# Patient Record
Sex: FEMALE | Race: WHITE | NOT HISPANIC OR LATINO | URBAN - METROPOLITAN AREA
[De-identification: names, ages, dates, MRNs, and addresses within clinical notes are randomized per-mention and may not be internally consistent; named-entity substitution may affect disease eponyms.]

---

## 2020-01-05 ENCOUNTER — INPATIENT (INPATIENT)
Facility: HOSPITAL | Age: 60
LOS: 2 days | Discharge: HOME | DRG: 872 | End: 2020-01-08
Attending: HOSPITALIST | Admitting: HOSPITALIST
Payer: COMMERCIAL

## 2020-01-05 VITALS
RESPIRATION RATE: 20 BRPM | HEIGHT: 68 IN | TEMPERATURE: 100 F | OXYGEN SATURATION: 96 % | WEIGHT: 173.06 LBS | DIASTOLIC BLOOD PRESSURE: 65 MMHG | SYSTOLIC BLOOD PRESSURE: 127 MMHG | HEART RATE: 121 BPM

## 2020-01-05 DIAGNOSIS — Z90.710 ACQUIRED ABSENCE OF BOTH CERVIX AND UTERUS: Chronic | ICD-10-CM

## 2020-01-05 LAB
APTT BLD: 33.4 SEC — SIGNIFICANT CHANGE UP (ref 27.5–36.3)
HCT VFR BLD CALC: 32.6 % — LOW (ref 34.5–45)
HGB BLD-MCNC: 10.1 G/DL — LOW (ref 11.5–15.5)
INR BLD: 1.09 RATIO — SIGNIFICANT CHANGE UP (ref 0.88–1.16)
MCHC RBC-ENTMCNC: 18.9 PG — LOW (ref 27–34)
MCHC RBC-ENTMCNC: 31 GM/DL — LOW (ref 32–36)
MCV RBC AUTO: 61.2 FL — LOW (ref 80–100)
PLATELET # BLD AUTO: 457 K/UL — HIGH (ref 150–400)
PROTHROM AB SERPL-ACNC: 12.6 SEC — SIGNIFICANT CHANGE UP (ref 10–12.9)
RBC # BLD: 5.33 M/UL — HIGH (ref 3.8–5.2)
RBC # FLD: 15.9 % — HIGH (ref 10.3–14.5)
WBC # BLD: 12.88 K/UL — HIGH (ref 3.8–10.5)
WBC # FLD AUTO: 12.88 K/UL — HIGH (ref 3.8–10.5)

## 2020-01-05 PROCEDURE — 99285 EMERGENCY DEPT VISIT HI MDM: CPT

## 2020-01-05 PROCEDURE — 71045 X-RAY EXAM CHEST 1 VIEW: CPT | Mod: 26

## 2020-01-05 RX ORDER — PIPERACILLIN AND TAZOBACTAM 4; .5 G/20ML; G/20ML
3.38 INJECTION, POWDER, LYOPHILIZED, FOR SOLUTION INTRAVENOUS ONCE
Refills: 0 | Status: COMPLETED | OUTPATIENT
Start: 2020-01-05 | End: 2020-01-05

## 2020-01-05 RX ORDER — MORPHINE SULFATE 50 MG/1
4 CAPSULE, EXTENDED RELEASE ORAL ONCE
Refills: 0 | Status: DISCONTINUED | OUTPATIENT
Start: 2020-01-05 | End: 2020-01-05

## 2020-01-05 RX ORDER — ACETAMINOPHEN 500 MG
650 TABLET ORAL ONCE
Refills: 0 | Status: COMPLETED | OUTPATIENT
Start: 2020-01-05 | End: 2020-01-05

## 2020-01-05 RX ORDER — ONDANSETRON 8 MG/1
4 TABLET, FILM COATED ORAL ONCE
Refills: 0 | Status: COMPLETED | OUTPATIENT
Start: 2020-01-05 | End: 2020-01-05

## 2020-01-05 RX ORDER — SODIUM CHLORIDE 9 MG/ML
2400 INJECTION INTRAMUSCULAR; INTRAVENOUS; SUBCUTANEOUS ONCE
Refills: 0 | Status: COMPLETED | OUTPATIENT
Start: 2020-01-05 | End: 2020-01-05

## 2020-01-05 RX ADMIN — SODIUM CHLORIDE 2400 MILLILITER(S): 9 INJECTION INTRAMUSCULAR; INTRAVENOUS; SUBCUTANEOUS at 23:35

## 2020-01-05 RX ADMIN — PIPERACILLIN AND TAZOBACTAM 200 GRAM(S): 4; .5 INJECTION, POWDER, LYOPHILIZED, FOR SOLUTION INTRAVENOUS at 23:48

## 2020-01-05 RX ADMIN — Medication 650 MILLIGRAM(S): at 23:48

## 2020-01-05 NOTE — ED ADULT TRIAGE NOTE - CHIEF COMPLAINT QUOTE
patient states that she has abdominal pain with nausea back pain chills fever has HX of diverticulitis, sepsis called

## 2020-01-05 NOTE — ED ADULT NURSE NOTE - OBJECTIVE STATEMENT
patient c/o left sided abdominal pain starting yesterday and worsening today, hx of diverticulitis, states "it feels the same as it always does." pain radiates to back. denies N/V/D, reports constipation. code sepsis called for fever and tachycardia, MD Blaustein at bedside for eval.

## 2020-01-05 NOTE — ED PROVIDER NOTE - OBJECTIVE STATEMENT
58 y/o F pt, with Hx of Cholecystectomy, Hysterectomy, Depression, ADHD (on Adderall and Cymbalta), HLD (on Zetia), Diverticulitis, presents to the ED, from urgent care, c/o LLQ abd pain that began last night. Pt reports bloating, describes as "fullness" which since persisted, and that began 4 days ago. However, says that she began feeling LLQ abd pain last night, worse with movement and palpation. Also reports constipation, decreased PO intake, nausea, fever, and chills. Pt has had Colonoscopy in the past, but no recent CT. Pt is allergic to Ibuprofen which causes hives, thus given 2x pills of Tylenol 1.5 hours ago at urgent care. Denies vomiting and diarrhea. GI: Dr. Thee Negron. PCP: Dr. Jean in [place].

## 2020-01-05 NOTE — ED PROVIDER NOTE - PROGRESS NOTE DETAILS
Labs and CT results as noted.  Case d/w Hospitalist and will admit for continued IV Abx and eval by GI

## 2020-01-05 NOTE — ED ADULT NURSE NOTE - CHPI ED NUR SYMPTOMS NEG
no diarrhea/no dysuria/no hematuria/no blood in stool/no burning urination/no chills/no nausea/no vomiting

## 2020-01-06 DIAGNOSIS — K57.92 DIVERTICULITIS OF INTESTINE, PART UNSPECIFIED, WITHOUT PERFORATION OR ABSCESS WITHOUT BLEEDING: ICD-10-CM

## 2020-01-06 LAB
ALBUMIN SERPL ELPH-MCNC: 3.6 G/DL — SIGNIFICANT CHANGE UP (ref 3.3–5.2)
ALBUMIN SERPL ELPH-MCNC: 4.4 G/DL — SIGNIFICANT CHANGE UP (ref 3.3–5.2)
ALP SERPL-CCNC: 110 U/L — SIGNIFICANT CHANGE UP (ref 40–120)
ALP SERPL-CCNC: 172 U/L — HIGH (ref 40–120)
ALT FLD-CCNC: 181 U/L — HIGH
ALT FLD-CCNC: 52 U/L — HIGH
ANION GAP SERPL CALC-SCNC: 13 MMOL/L — SIGNIFICANT CHANGE UP (ref 5–17)
ANION GAP SERPL CALC-SCNC: 8 MMOL/L — SIGNIFICANT CHANGE UP (ref 5–17)
ANISOCYTOSIS BLD QL: SIGNIFICANT CHANGE UP
APPEARANCE UR: CLEAR — SIGNIFICANT CHANGE UP
AST SERPL-CCNC: 202 U/L — HIGH
AST SERPL-CCNC: 42 U/L — HIGH
BACTERIA # UR AUTO: ABNORMAL
BASOPHILS # BLD AUTO: 0 K/UL — SIGNIFICANT CHANGE UP (ref 0–0.2)
BASOPHILS NFR BLD AUTO: 0 % — SIGNIFICANT CHANGE UP (ref 0–2)
BILIRUB SERPL-MCNC: 0.4 MG/DL — SIGNIFICANT CHANGE UP (ref 0.4–2)
BILIRUB SERPL-MCNC: 1.1 MG/DL — SIGNIFICANT CHANGE UP (ref 0.4–2)
BILIRUB UR-MCNC: NEGATIVE — SIGNIFICANT CHANGE UP
BUN SERPL-MCNC: 10 MG/DL — SIGNIFICANT CHANGE UP (ref 8–20)
BUN SERPL-MCNC: 14 MG/DL — SIGNIFICANT CHANGE UP (ref 8–20)
CALCIUM SERPL-MCNC: 8.3 MG/DL — LOW (ref 8.6–10.2)
CALCIUM SERPL-MCNC: 9.3 MG/DL — SIGNIFICANT CHANGE UP (ref 8.6–10.2)
CHLORIDE SERPL-SCNC: 101 MMOL/L — SIGNIFICANT CHANGE UP (ref 98–107)
CHLORIDE SERPL-SCNC: 96 MMOL/L — LOW (ref 98–107)
CO2 SERPL-SCNC: 23 MMOL/L — SIGNIFICANT CHANGE UP (ref 22–29)
CO2 SERPL-SCNC: 25 MMOL/L — SIGNIFICANT CHANGE UP (ref 22–29)
COLOR SPEC: YELLOW — SIGNIFICANT CHANGE UP
COMMENT - URINE: SIGNIFICANT CHANGE UP
CREAT SERPL-MCNC: 0.86 MG/DL — SIGNIFICANT CHANGE UP (ref 0.5–1.3)
CREAT SERPL-MCNC: 0.93 MG/DL — SIGNIFICANT CHANGE UP (ref 0.5–1.3)
DACRYOCYTES BLD QL SMEAR: SLIGHT — SIGNIFICANT CHANGE UP
DIFF PNL FLD: ABNORMAL
ELLIPTOCYTES BLD QL SMEAR: SLIGHT — SIGNIFICANT CHANGE UP
EOSINOPHIL # BLD AUTO: 0.22 K/UL — SIGNIFICANT CHANGE UP (ref 0–0.5)
EOSINOPHIL NFR BLD AUTO: 1.7 % — SIGNIFICANT CHANGE UP (ref 0–6)
EPI CELLS # UR: SIGNIFICANT CHANGE UP
GIANT PLATELETS BLD QL SMEAR: PRESENT — SIGNIFICANT CHANGE UP
GLUCOSE SERPL-MCNC: 106 MG/DL — SIGNIFICANT CHANGE UP (ref 70–115)
GLUCOSE SERPL-MCNC: 114 MG/DL — SIGNIFICANT CHANGE UP (ref 70–115)
GLUCOSE UR QL: NEGATIVE MG/DL — SIGNIFICANT CHANGE UP
HCT VFR BLD CALC: 28.1 % — LOW (ref 34.5–45)
HGB BLD-MCNC: 8.3 G/DL — LOW (ref 11.5–15.5)
HYPOCHROMIA BLD QL: SLIGHT — SIGNIFICANT CHANGE UP
KETONES UR-MCNC: NEGATIVE — SIGNIFICANT CHANGE UP
LEUKOCYTE ESTERASE UR-ACNC: ABNORMAL
LYMPHOCYTES # BLD AUTO: 1.34 K/UL — SIGNIFICANT CHANGE UP (ref 1–3.3)
LYMPHOCYTES # BLD AUTO: 10.4 % — LOW (ref 13–44)
MANUAL SMEAR VERIFICATION: SIGNIFICANT CHANGE UP
MCHC RBC-ENTMCNC: 18.9 PG — LOW (ref 27–34)
MCHC RBC-ENTMCNC: 29.5 GM/DL — LOW (ref 32–36)
MCV RBC AUTO: 63.9 FL — LOW (ref 80–100)
MICROCYTES BLD QL: SIGNIFICANT CHANGE UP
MONOCYTES # BLD AUTO: 0.79 K/UL — SIGNIFICANT CHANGE UP (ref 0–0.9)
MONOCYTES NFR BLD AUTO: 6.1 % — SIGNIFICANT CHANGE UP (ref 2–14)
MYELOCYTES NFR BLD: 0.9 % — HIGH (ref 0–0)
NEUTROPHILS # BLD AUTO: 10.3 K/UL — HIGH (ref 1.8–7.4)
NEUTROPHILS NFR BLD AUTO: 80 % — HIGH (ref 43–77)
NITRITE UR-MCNC: NEGATIVE — SIGNIFICANT CHANGE UP
OVALOCYTES BLD QL SMEAR: SLIGHT — SIGNIFICANT CHANGE UP
PH UR: 5 — SIGNIFICANT CHANGE UP (ref 5–8)
PLAT MORPH BLD: NORMAL — SIGNIFICANT CHANGE UP
PLATELET # BLD AUTO: 342 K/UL — SIGNIFICANT CHANGE UP (ref 150–400)
PLATELET COUNT - ESTIMATE: NORMAL — SIGNIFICANT CHANGE UP
POIKILOCYTOSIS BLD QL AUTO: SLIGHT — SIGNIFICANT CHANGE UP
POLYCHROMASIA BLD QL SMEAR: SIGNIFICANT CHANGE UP
POTASSIUM SERPL-MCNC: 3.9 MMOL/L — SIGNIFICANT CHANGE UP (ref 3.5–5.3)
POTASSIUM SERPL-MCNC: 4.4 MMOL/L — SIGNIFICANT CHANGE UP (ref 3.5–5.3)
POTASSIUM SERPL-SCNC: 3.9 MMOL/L — SIGNIFICANT CHANGE UP (ref 3.5–5.3)
POTASSIUM SERPL-SCNC: 4.4 MMOL/L — SIGNIFICANT CHANGE UP (ref 3.5–5.3)
PROT SERPL-MCNC: 6.3 G/DL — LOW (ref 6.6–8.7)
PROT SERPL-MCNC: 7.6 G/DL — SIGNIFICANT CHANGE UP (ref 6.6–8.7)
PROT UR-MCNC: NEGATIVE MG/DL — SIGNIFICANT CHANGE UP
RBC # BLD: 4.4 M/UL — SIGNIFICANT CHANGE UP (ref 3.8–5.2)
RBC # FLD: 16 % — HIGH (ref 10.3–14.5)
RBC BLD AUTO: ABNORMAL
RBC CASTS # UR COMP ASSIST: SIGNIFICANT CHANGE UP /HPF (ref 0–4)
SODIUM SERPL-SCNC: 132 MMOL/L — LOW (ref 135–145)
SODIUM SERPL-SCNC: 134 MMOL/L — LOW (ref 135–145)
SP GR SPEC: 1.01 — SIGNIFICANT CHANGE UP (ref 1.01–1.02)
UROBILINOGEN FLD QL: NEGATIVE MG/DL — SIGNIFICANT CHANGE UP
VARIANT LYMPHS # BLD: 0.9 % — SIGNIFICANT CHANGE UP (ref 0–6)
WBC # BLD: 7.63 K/UL — SIGNIFICANT CHANGE UP (ref 3.8–10.5)
WBC # FLD AUTO: 7.63 K/UL — SIGNIFICANT CHANGE UP (ref 3.8–10.5)
WBC UR QL: ABNORMAL

## 2020-01-06 PROCEDURE — 74177 CT ABD & PELVIS W/CONTRAST: CPT | Mod: 26

## 2020-01-06 PROCEDURE — 93010 ELECTROCARDIOGRAM REPORT: CPT

## 2020-01-06 RX ORDER — HYDROMORPHONE HYDROCHLORIDE 2 MG/ML
1 INJECTION INTRAMUSCULAR; INTRAVENOUS; SUBCUTANEOUS EVERY 4 HOURS
Refills: 0 | Status: DISCONTINUED | OUTPATIENT
Start: 2020-01-06 | End: 2020-01-08

## 2020-01-06 RX ORDER — HYDROMORPHONE HYDROCHLORIDE 2 MG/ML
1 INJECTION INTRAMUSCULAR; INTRAVENOUS; SUBCUTANEOUS ONCE
Refills: 0 | Status: DISCONTINUED | OUTPATIENT
Start: 2020-01-06 | End: 2020-01-06

## 2020-01-06 RX ORDER — EZETIMIBE 10 MG/1
10 TABLET ORAL DAILY
Refills: 0 | Status: DISCONTINUED | OUTPATIENT
Start: 2020-01-06 | End: 2020-01-08

## 2020-01-06 RX ORDER — ONDANSETRON 8 MG/1
4 TABLET, FILM COATED ORAL ONCE
Refills: 0 | Status: COMPLETED | OUTPATIENT
Start: 2020-01-06 | End: 2020-01-06

## 2020-01-06 RX ORDER — METRONIDAZOLE 500 MG
500 TABLET ORAL EVERY 8 HOURS
Refills: 0 | Status: DISCONTINUED | OUTPATIENT
Start: 2020-01-06 | End: 2020-01-08

## 2020-01-06 RX ORDER — DULOXETINE HYDROCHLORIDE 30 MG/1
0 CAPSULE, DELAYED RELEASE ORAL
Qty: 0 | Refills: 0 | DISCHARGE

## 2020-01-06 RX ORDER — LEVOTHYROXINE SODIUM 125 MCG
1 TABLET ORAL
Qty: 0 | Refills: 0 | DISCHARGE

## 2020-01-06 RX ORDER — CIPROFLOXACIN LACTATE 400MG/40ML
400 VIAL (ML) INTRAVENOUS EVERY 12 HOURS
Refills: 0 | Status: DISCONTINUED | OUTPATIENT
Start: 2020-01-06 | End: 2020-01-08

## 2020-01-06 RX ORDER — EZETIMIBE 10 MG/1
1 TABLET ORAL
Qty: 0 | Refills: 0 | DISCHARGE

## 2020-01-06 RX ORDER — SACCHAROMYCES BOULARDII 250 MG
250 POWDER IN PACKET (EA) ORAL
Refills: 0 | Status: DISCONTINUED | OUTPATIENT
Start: 2020-01-06 | End: 2020-01-08

## 2020-01-06 RX ORDER — ACETAMINOPHEN 500 MG
650 TABLET ORAL EVERY 6 HOURS
Refills: 0 | Status: DISCONTINUED | OUTPATIENT
Start: 2020-01-06 | End: 2020-01-08

## 2020-01-06 RX ORDER — ONDANSETRON 8 MG/1
4 TABLET, FILM COATED ORAL EVERY 6 HOURS
Refills: 0 | Status: DISCONTINUED | OUTPATIENT
Start: 2020-01-06 | End: 2020-01-08

## 2020-01-06 RX ORDER — INFLUENZA VIRUS VACCINE 15; 15; 15; 15 UG/.5ML; UG/.5ML; UG/.5ML; UG/.5ML
0.5 SUSPENSION INTRAMUSCULAR ONCE
Refills: 0 | Status: DISCONTINUED | OUTPATIENT
Start: 2020-01-06 | End: 2020-01-08

## 2020-01-06 RX ORDER — LEVOTHYROXINE SODIUM 125 MCG
50 TABLET ORAL DAILY
Refills: 0 | Status: DISCONTINUED | OUTPATIENT
Start: 2020-01-06 | End: 2020-01-08

## 2020-01-06 RX ORDER — DEXTROAMPHETAMINE SACCHARATE, AMPHETAMINE ASPARTATE, DEXTROAMPHETAMINE SULFATE AND AMPHETAMINE SULFATE 1.875; 1.875; 1.875; 1.875 MG/1; MG/1; MG/1; MG/1
0 TABLET ORAL
Qty: 0 | Refills: 0 | DISCHARGE

## 2020-01-06 RX ORDER — CITALOPRAM 10 MG/1
20 TABLET, FILM COATED ORAL DAILY
Refills: 0 | Status: DISCONTINUED | OUTPATIENT
Start: 2020-01-06 | End: 2020-01-08

## 2020-01-06 RX ORDER — DEXTROAMPHETAMINE SACCHARATE, AMPHETAMINE ASPARTATE, DEXTROAMPHETAMINE SULFATE AND AMPHETAMINE SULFATE 1.875; 1.875; 1.875; 1.875 MG/1; MG/1; MG/1; MG/1
1 TABLET ORAL
Qty: 0 | Refills: 0 | DISCHARGE

## 2020-01-06 RX ORDER — SODIUM CHLORIDE 9 MG/ML
1000 INJECTION INTRAMUSCULAR; INTRAVENOUS; SUBCUTANEOUS
Refills: 0 | Status: DISCONTINUED | OUTPATIENT
Start: 2020-01-06 | End: 2020-01-07

## 2020-01-06 RX ORDER — MORPHINE SULFATE 50 MG/1
2 CAPSULE, EXTENDED RELEASE ORAL ONCE
Refills: 0 | Status: DISCONTINUED | OUTPATIENT
Start: 2020-01-06 | End: 2020-01-06

## 2020-01-06 RX ORDER — DEXTROAMPHETAMINE SACCHARATE, AMPHETAMINE ASPARTATE, DEXTROAMPHETAMINE SULFATE AND AMPHETAMINE SULFATE 1.875; 1.875; 1.875; 1.875 MG/1; MG/1; MG/1; MG/1
20 TABLET ORAL DAILY
Refills: 0 | Status: DISCONTINUED | OUTPATIENT
Start: 2020-01-06 | End: 2020-01-08

## 2020-01-06 RX ADMIN — Medication 100 MILLIGRAM(S): at 15:55

## 2020-01-06 RX ADMIN — ONDANSETRON 4 MILLIGRAM(S): 8 TABLET, FILM COATED ORAL at 00:05

## 2020-01-06 RX ADMIN — HYDROMORPHONE HYDROCHLORIDE 1 MILLIGRAM(S): 2 INJECTION INTRAMUSCULAR; INTRAVENOUS; SUBCUTANEOUS at 06:35

## 2020-01-06 RX ADMIN — MORPHINE SULFATE 2 MILLIGRAM(S): 50 CAPSULE, EXTENDED RELEASE ORAL at 14:32

## 2020-01-06 RX ADMIN — Medication 200 MILLIGRAM(S): at 16:58

## 2020-01-06 RX ADMIN — HYDROMORPHONE HYDROCHLORIDE 1 MILLIGRAM(S): 2 INJECTION INTRAMUSCULAR; INTRAVENOUS; SUBCUTANEOUS at 02:36

## 2020-01-06 RX ADMIN — HYDROMORPHONE HYDROCHLORIDE 1 MILLIGRAM(S): 2 INJECTION INTRAMUSCULAR; INTRAVENOUS; SUBCUTANEOUS at 02:15

## 2020-01-06 RX ADMIN — Medication 100 MILLIGRAM(S): at 23:25

## 2020-01-06 RX ADMIN — Medication 200 MILLIGRAM(S): at 06:49

## 2020-01-06 RX ADMIN — Medication 250 MILLIGRAM(S): at 16:58

## 2020-01-06 RX ADMIN — SODIUM CHLORIDE 100 MILLILITER(S): 9 INJECTION INTRAMUSCULAR; INTRAVENOUS; SUBCUTANEOUS at 06:33

## 2020-01-06 RX ADMIN — Medication 100 MILLIGRAM(S): at 08:02

## 2020-01-06 RX ADMIN — MORPHINE SULFATE 4 MILLIGRAM(S): 50 CAPSULE, EXTENDED RELEASE ORAL at 00:03

## 2020-01-06 RX ADMIN — HYDROMORPHONE HYDROCHLORIDE 1 MILLIGRAM(S): 2 INJECTION INTRAMUSCULAR; INTRAVENOUS; SUBCUTANEOUS at 21:57

## 2020-01-06 RX ADMIN — Medication 50 MICROGRAM(S): at 06:50

## 2020-01-06 RX ADMIN — Medication 250 MILLIGRAM(S): at 06:49

## 2020-01-06 RX ADMIN — HYDROMORPHONE HYDROCHLORIDE 1 MILLIGRAM(S): 2 INJECTION INTRAMUSCULAR; INTRAVENOUS; SUBCUTANEOUS at 22:12

## 2020-01-06 NOTE — ED ADULT NURSE REASSESSMENT NOTE - NS ED NURSE REASSESS COMMENT FT1
Admitting physician Dr. MARCH at pt bedside at this time, POC discussed with pt and spouse, pt to be admitted to the hospital and administered ATC IV abx, questions encouraged and answered appropriately, pt agrees with plan, safety and comfort maintained, awaiting admission orders.

## 2020-01-06 NOTE — H&P ADULT - HISTORY OF PRESENT ILLNESS
60 y/o female with PMH of diverticulitis (5 episodes in the past), depression, ADHD (on Adderall), HLD came to the ED complaining of LLQ pain. Patient said her symptom started about 1 week ago with abdominal bloating she described as heaviness/fullness; alternate between constipation and diarrhea. 4 days ago, her symptom worsened, pain localized to the LLQ, sharp pain with "intense spasm" radiating to the back, exacerbated by moving and turning, associated with fever and decrease PO intake, nausea. She has no vomiting, chest pain, shortness of breath, palpitation, dysuria, hematuria, melena. Last colonoscopy in 2013 showed diverticulosis in the sigmoid colon.

## 2020-01-06 NOTE — ED ADULT NURSE REASSESSMENT NOTE - NS ED NURSE REASSESS COMMENT FT1
Report recvd from critical care RN, pt care endorsed, pt recvdA&Ox4, offers no complaints, able to verbalize reasoning for stay, pt awaiting further test, pt placed on CM NSR, pt safety and comfort maintained. Report recvd from critical care RN, pt care endorsed, pt recvdA&Ox4, pt now complaining of left sided pain, medication to be administered as ordered, pt able to verbalize reasoning for stay, pt awaiting further test, pt placed on CM NSR, pt safety and comfort maintained.

## 2020-01-06 NOTE — ED ADULT NURSE REASSESSMENT NOTE - NS ED NURSE REASSESS COMMENT FT1
Pt received in the stretcher resting comfortably, no distress noted, no chest pain reported, VSS, breathing easy and unlabored , will continue to monitor

## 2020-01-06 NOTE — ED ADULT NURSE REASSESSMENT NOTE - NS ED NURSE REASSESS COMMENT FT1
Pt moved over transitional Unit, verbal report give to receiving nurse Dm , all questions answered, pt in no distress at the time of transfer, care endorsed at this time

## 2020-01-06 NOTE — H&P ADULT - ASSESSMENT
60 y/o female with PMH of diverticulitis (5 episodes in the past), depression, ADHD (on Adderall), HLD came to the ED complaining of LLQ pain. CT abdomen: acute descending colonic diverticulitis.     Acute diverticulitis   Admit to medical floor   CT abdomen as noted above   Zosyn once given in the ED; will continue with Cipro and Flagyl   Pain control   Gentle hydration     LLQ abdominal pain  Due to acute diverticulitis   Plan as above     Sepsis   Likely 2/2 to diverticulitis   Septic work up sent; follow up   Continue antibiotic   Trend WBC and fever   Monitor BP; keep SBP > 90 and MAP > 65     Hyponatremia   Na: 132   IVF given   Monitor BMP     Hypothyroidism   Synthroid 50mcg     Depression/ADHD   Patient is on Cymbalta and Adderall but unsure of the dosage; please confirm dosage and continue     HLD   Zetia 10mg     Supportive   DVT prophylaxis: Heparin sc   Diet: clear liquid; advance as needed 58 y/o female with PMH of diverticulitis (5 episodes in the past), depression, ADHD (on Adderall), HLD came to the ED complaining of LLQ pain. CT abdomen: acute descending colonic diverticulitis.     Acute diverticulitis   Admit to medical floor   CT abdomen as noted above   Zosyn once given in the ED; will continue with Cipro and Flagyl   Pain control   Gentle hydration   Consider GI consult as needed or outpatient follow up. Patient is scheduled to see GI next week    LLQ abdominal pain  Due to acute diverticulitis   Plan as above     Sepsis   Likely 2/2 to diverticulitis   Septic work up sent; follow up   Continue antibiotic   Trend WBC and fever   Monitor BP; keep SBP > 90 and MAP > 65     Hyponatremia   Na: 132   IVF given   Monitor BMP     Hypothyroidism   Synthroid 50mcg     Depression/ADHD   Patient is on Cymbalta and Adderall but unsure of the dosage; please confirm dosage and continue     HLD   Zetia 10mg     Supportive   DVT prophylaxis: Heparin sc   Diet: clear liquid; advance as needed

## 2020-01-06 NOTE — ED ADULT NURSE REASSESSMENT NOTE - NS ED NURSE REASSESS COMMENT FT1
Pt just returned to unit from test, pt offers no complaints, VSS, pt awaiting test results, pt and spouse at bedside verbalize understanding and agree, pt resting comfortably on CM in NSR, safety and comfort maintained.

## 2020-01-07 LAB
ALBUMIN SERPL ELPH-MCNC: 3.4 G/DL — SIGNIFICANT CHANGE UP (ref 3.3–5.2)
ALP SERPL-CCNC: 168 U/L — HIGH (ref 40–120)
ALT FLD-CCNC: 112 U/L — HIGH
ANION GAP SERPL CALC-SCNC: 13 MMOL/L — SIGNIFICANT CHANGE UP (ref 5–17)
AST SERPL-CCNC: 67 U/L — HIGH
BILIRUB SERPL-MCNC: 0.4 MG/DL — SIGNIFICANT CHANGE UP (ref 0.4–2)
BUN SERPL-MCNC: 11 MG/DL — SIGNIFICANT CHANGE UP (ref 8–20)
CALCIUM SERPL-MCNC: 8.6 MG/DL — SIGNIFICANT CHANGE UP (ref 8.6–10.2)
CHLORIDE SERPL-SCNC: 100 MMOL/L — SIGNIFICANT CHANGE UP (ref 98–107)
CO2 SERPL-SCNC: 25 MMOL/L — SIGNIFICANT CHANGE UP (ref 22–29)
CREAT SERPL-MCNC: 0.76 MG/DL — SIGNIFICANT CHANGE UP (ref 0.5–1.3)
CULTURE RESULTS: SIGNIFICANT CHANGE UP
CULTURE RESULTS: SIGNIFICANT CHANGE UP
FERRITIN SERPL-MCNC: 234 NG/ML — HIGH (ref 15–150)
FOLATE SERPL-MCNC: 12 NG/ML — SIGNIFICANT CHANGE UP
GLUCOSE SERPL-MCNC: 138 MG/DL — HIGH (ref 70–115)
HCT VFR BLD CALC: 26 % — LOW (ref 34.5–45)
HCV AB S/CO SERPL IA: 0.1 S/CO — SIGNIFICANT CHANGE UP (ref 0–0.99)
HCV AB SERPL-IMP: SIGNIFICANT CHANGE UP
HGB BLD-MCNC: 7.7 G/DL — LOW (ref 11.5–15.5)
IRON SATN MFR SERPL: 11 % — LOW (ref 14–50)
IRON SATN MFR SERPL: 29 UG/DL — LOW (ref 37–145)
MAGNESIUM SERPL-MCNC: 1.8 MG/DL — SIGNIFICANT CHANGE UP (ref 1.6–2.6)
MCHC RBC-ENTMCNC: 18.6 PG — LOW (ref 27–34)
MCHC RBC-ENTMCNC: 29.6 GM/DL — LOW (ref 32–36)
MCV RBC AUTO: 62.7 FL — LOW (ref 80–100)
PLATELET # BLD AUTO: 320 K/UL — SIGNIFICANT CHANGE UP (ref 150–400)
POTASSIUM SERPL-MCNC: 3.6 MMOL/L — SIGNIFICANT CHANGE UP (ref 3.5–5.3)
POTASSIUM SERPL-SCNC: 3.6 MMOL/L — SIGNIFICANT CHANGE UP (ref 3.5–5.3)
PROT SERPL-MCNC: 6 G/DL — LOW (ref 6.6–8.7)
RBC # BLD: 4.15 M/UL — SIGNIFICANT CHANGE UP (ref 3.8–5.2)
RBC # FLD: 15.9 % — HIGH (ref 10.3–14.5)
SODIUM SERPL-SCNC: 138 MMOL/L — SIGNIFICANT CHANGE UP (ref 135–145)
SPECIMEN SOURCE: SIGNIFICANT CHANGE UP
SPECIMEN SOURCE: SIGNIFICANT CHANGE UP
TIBC SERPL-MCNC: 253 UG/DL — SIGNIFICANT CHANGE UP (ref 220–430)
TRANSFERRIN SERPL-MCNC: 177 MG/DL — LOW (ref 192–382)
VIT B12 SERPL-MCNC: 412 PG/ML — SIGNIFICANT CHANGE UP (ref 232–1245)
WBC # BLD: 8.37 K/UL — SIGNIFICANT CHANGE UP (ref 3.8–10.5)
WBC # FLD AUTO: 8.37 K/UL — SIGNIFICANT CHANGE UP (ref 3.8–10.5)

## 2020-01-07 PROCEDURE — 99232 SBSQ HOSP IP/OBS MODERATE 35: CPT

## 2020-01-07 RX ADMIN — Medication 100 MILLIGRAM(S): at 16:33

## 2020-01-07 RX ADMIN — Medication 200 MILLIGRAM(S): at 06:04

## 2020-01-07 RX ADMIN — Medication 50 MICROGRAM(S): at 06:04

## 2020-01-07 RX ADMIN — CITALOPRAM 20 MILLIGRAM(S): 10 TABLET, FILM COATED ORAL at 12:30

## 2020-01-07 RX ADMIN — DEXTROAMPHETAMINE SACCHARATE, AMPHETAMINE ASPARTATE, DEXTROAMPHETAMINE SULFATE AND AMPHETAMINE SULFATE 20 MILLIGRAM(S): 1.875; 1.875; 1.875; 1.875 TABLET ORAL at 12:30

## 2020-01-07 RX ADMIN — Medication 200 MILLIGRAM(S): at 18:12

## 2020-01-07 RX ADMIN — Medication 250 MILLIGRAM(S): at 06:04

## 2020-01-07 RX ADMIN — EZETIMIBE 10 MILLIGRAM(S): 10 TABLET ORAL at 12:30

## 2020-01-07 RX ADMIN — Medication 100 MILLIGRAM(S): at 09:36

## 2020-01-07 RX ADMIN — Medication 250 MILLIGRAM(S): at 18:12

## 2020-01-07 NOTE — PROGRESS NOTE ADULT - SUBJECTIVE AND OBJECTIVE BOX
DENNIS CARMONA Female 59y [id number]    Patient is a 59y old  Female who presents with a chief complaint of Acute diverticulitis (2020 05:45)      Subjective/objective:  Pt seen and examined at bedside, no over night event reported by night staff. Pt reports overall feeling better, abdomen pain improving, no nausea/ vomiting or diarrhea. She wants to eat solid food.     Review of system:  No fever, chills, nausea, vomiting, headache, dizziness, chest pain, SOB or palpitation.      PHYSICAL EXAM:    Vital Signs Last 24 Hrs  T(C): 36.9 (2020 08:22), Max: 37.5 (2020 06:02)  T(F): 98.5 (2020 08:22), Max: 99.5 (2020 06:02)  HR: 86 (2020 08:22) (85 - 95)  BP: 109/60 (2020 08:22) (100/63 - 119/72)  BP(mean): --  RR: 18 (2020 08:22) (16 - 18)  SpO2: 95% (2020 08:22) (91% - 95%)    GENERAL: Pt lying comfortably, NAD.  CHEST/LUNG: Clear to auscultation bilaterally; No wheezing.  HEART: S1S2+, Regular rate and rhythm; No murmurs.  ABDOMEN: Soft, Mild tenderness+, Nondistended; Bowel sounds present.  Extremities: No LE edema, pulses+  NEURO: AAOX3, no focal deficits, no motor r sensory loss.  PSYCH: normal mood.      MEDICATIONS  (STANDING):  amphetamine/dextroamphetamine 20 milliGRAM(s) Oral daily  ciprofloxacin   IVPB 400 milliGRAM(s) IV Intermittent every 12 hours  citalopram 20 milliGRAM(s) Oral daily  ezetimibe 10 milliGRAM(s) Oral daily  influenza   Vaccine 0.5 milliLiter(s) IntraMuscular once  levothyroxine 50 MICROGram(s) Oral daily  metroNIDAZOLE  IVPB 500 milliGRAM(s) IV Intermittent every 8 hours  saccharomyces boulardii 250 milliGRAM(s) Oral two times a day    MEDICATIONS  (PRN):  acetaminophen   Tablet .. 650 milliGRAM(s) Oral every 6 hours PRN Temp greater or equal to 38C (100.4F), Mild Pain (1 - 3)  HYDROmorphone  Injectable 1 milliGRAM(s) IV Push every 4 hours PRN Severe Pain (7 - 10)  ondansetron Injectable 4 milliGRAM(s) IV Push every 6 hours PRN Nausea and/or Vomiting        Labs:  LABS:                        7.7    8.37  )-----------( 320      ( 2020 07:08 )             26.0     01-07    138  |  100  |  11.0  ----------------------------<  138<H>  3.6   |  25.0  |  0.76    Ca    8.6      2020 07:08  Mg     1.8         TPro  6.0<L>  /  Alb  3.4  /  TBili  0.4  /  DBili  x   /  AST  67<H>  /  ALT  112<H>  /  AlkPhos  168<H>  -    PT/INR - ( 2020 23:44 )   PT: 12.6 sec;   INR: 1.09 ratio         PTT - ( 2020 23:44 )  PTT:33.4 sec    LIVER FUNCTIONS - ( 2020 07:08 )  Alb: 3.4 g/dL / Pro: 6.0 g/dL / ALK PHOS: 168 U/L / ALT: 112 U/L / AST: 67 U/L / GGT: x           Urinalysis Basic - ( 2020 01:39 )    Color: Yellow / Appearance: Clear / S.010 / pH: x  Gluc: x / Ketone: Negative  / Bili: Negative / Urobili: Negative mg/dL   Blood: x / Protein: Negative mg/dL / Nitrite: Negative   Leuk Esterase: Small / RBC: 0-2 /HPF / WBC 6-10   Sq Epi: x / Non Sq Epi: Occasional / Bacteria: Occasional

## 2020-01-07 NOTE — PROGRESS NOTE ADULT - ASSESSMENT
60 y/o female with PMH of diverticulitis (5 episodes in the past), depression, ADHD (on Adderall), HLD, came to the ED complaining of LLQ pain. CT abdomen showed acute descending colonic diverticulitis. Pt admitted to medicine, started on IV Cipro/Flagyl.     >Acute diverticulitis:  - Clinically improving.   - C/w Cipro/ Flagyl.   - Wants to eat solid food- Advance diet and Advised pt if more Pain or vomiting- stop eating and let RN know.   - F/u GI PPCR and Blood cx.     >Sepsis due to above- Present on admission / leukocytosis, resolved now. plan as above.     >Anemia- hgb on admission 10, today 7.7, likely has chronic anemia and now dilutional being on IVFs. No active bleeding. Anemia w/u c/w AOCD. Monitor CBC for now.     >Hyponatremia- 2/2 dehydration, resolved now.      >Hx Hypothyroidism- C/w LT4 50 mcg.  >Hx Depression/ADHD- C/w home Cymbalta and Adderall.   >Hx HLD- C/w Zetia 10mg   >DVT ppx- SCd.

## 2020-01-08 VITALS
TEMPERATURE: 99 F | SYSTOLIC BLOOD PRESSURE: 111 MMHG | OXYGEN SATURATION: 96 % | HEART RATE: 78 BPM | RESPIRATION RATE: 18 BRPM | DIASTOLIC BLOOD PRESSURE: 71 MMHG

## 2020-01-08 LAB
ALBUMIN SERPL ELPH-MCNC: 3.3 G/DL — SIGNIFICANT CHANGE UP (ref 3.3–5.2)
ALP SERPL-CCNC: 156 U/L — HIGH (ref 40–120)
ALT FLD-CCNC: 84 U/L — HIGH
ANION GAP SERPL CALC-SCNC: 16 MMOL/L — SIGNIFICANT CHANGE UP (ref 5–17)
AST SERPL-CCNC: 33 U/L — HIGH
BILIRUB SERPL-MCNC: 0.3 MG/DL — LOW (ref 0.4–2)
BUN SERPL-MCNC: 11 MG/DL — SIGNIFICANT CHANGE UP (ref 8–20)
CALCIUM SERPL-MCNC: 8.9 MG/DL — SIGNIFICANT CHANGE UP (ref 8.6–10.2)
CHLORIDE SERPL-SCNC: 102 MMOL/L — SIGNIFICANT CHANGE UP (ref 98–107)
CO2 SERPL-SCNC: 24 MMOL/L — SIGNIFICANT CHANGE UP (ref 22–29)
CREAT SERPL-MCNC: 0.9 MG/DL — SIGNIFICANT CHANGE UP (ref 0.5–1.3)
GLUCOSE SERPL-MCNC: 101 MG/DL — SIGNIFICANT CHANGE UP (ref 70–115)
HCT VFR BLD CALC: 26.8 % — LOW (ref 34.5–45)
HGB BLD-MCNC: 8.2 G/DL — LOW (ref 11.5–15.5)
MCHC RBC-ENTMCNC: 19.1 PG — LOW (ref 27–34)
MCHC RBC-ENTMCNC: 30.6 GM/DL — LOW (ref 32–36)
MCV RBC AUTO: 62.3 FL — LOW (ref 80–100)
PLATELET # BLD AUTO: 377 K/UL — SIGNIFICANT CHANGE UP (ref 150–400)
POTASSIUM SERPL-MCNC: 3.6 MMOL/L — SIGNIFICANT CHANGE UP (ref 3.5–5.3)
POTASSIUM SERPL-SCNC: 3.6 MMOL/L — SIGNIFICANT CHANGE UP (ref 3.5–5.3)
PROT SERPL-MCNC: 6.5 G/DL — LOW (ref 6.6–8.7)
RBC # BLD: 4.3 M/UL — SIGNIFICANT CHANGE UP (ref 3.8–5.2)
RBC # FLD: 15.6 % — HIGH (ref 10.3–14.5)
SODIUM SERPL-SCNC: 142 MMOL/L — SIGNIFICANT CHANGE UP (ref 135–145)
WBC # BLD: 6.1 K/UL — SIGNIFICANT CHANGE UP (ref 3.8–10.5)
WBC # FLD AUTO: 6.1 K/UL — SIGNIFICANT CHANGE UP (ref 3.8–10.5)

## 2020-01-08 PROCEDURE — 84466 ASSAY OF TRANSFERRIN: CPT

## 2020-01-08 PROCEDURE — 74177 CT ABD & PELVIS W/CONTRAST: CPT

## 2020-01-08 PROCEDURE — 85610 PROTHROMBIN TIME: CPT

## 2020-01-08 PROCEDURE — 81001 URINALYSIS AUTO W/SCOPE: CPT

## 2020-01-08 PROCEDURE — 96374 THER/PROPH/DIAG INJ IV PUSH: CPT | Mod: XU

## 2020-01-08 PROCEDURE — 93005 ELECTROCARDIOGRAM TRACING: CPT

## 2020-01-08 PROCEDURE — 36415 COLL VENOUS BLD VENIPUNCTURE: CPT

## 2020-01-08 PROCEDURE — 86803 HEPATITIS C AB TEST: CPT

## 2020-01-08 PROCEDURE — 83605 ASSAY OF LACTIC ACID: CPT

## 2020-01-08 PROCEDURE — 85027 COMPLETE CBC AUTOMATED: CPT

## 2020-01-08 PROCEDURE — 87086 URINE CULTURE/COLONY COUNT: CPT

## 2020-01-08 PROCEDURE — 99239 HOSP IP/OBS DSCHRG MGMT >30: CPT

## 2020-01-08 PROCEDURE — 96375 TX/PRO/DX INJ NEW DRUG ADDON: CPT

## 2020-01-08 PROCEDURE — 83550 IRON BINDING TEST: CPT

## 2020-01-08 PROCEDURE — 85730 THROMBOPLASTIN TIME PARTIAL: CPT

## 2020-01-08 PROCEDURE — 71045 X-RAY EXAM CHEST 1 VIEW: CPT

## 2020-01-08 PROCEDURE — 82746 ASSAY OF FOLIC ACID SERUM: CPT

## 2020-01-08 PROCEDURE — 87040 BLOOD CULTURE FOR BACTERIA: CPT

## 2020-01-08 PROCEDURE — 82728 ASSAY OF FERRITIN: CPT

## 2020-01-08 PROCEDURE — 96376 TX/PRO/DX INJ SAME DRUG ADON: CPT

## 2020-01-08 PROCEDURE — 99285 EMERGENCY DEPT VISIT HI MDM: CPT | Mod: 25

## 2020-01-08 PROCEDURE — 87507 IADNA-DNA/RNA PROBE TQ 12-25: CPT

## 2020-01-08 PROCEDURE — 82607 VITAMIN B-12: CPT

## 2020-01-08 PROCEDURE — 83735 ASSAY OF MAGNESIUM: CPT

## 2020-01-08 PROCEDURE — 80053 COMPREHEN METABOLIC PANEL: CPT

## 2020-01-08 PROCEDURE — 83540 ASSAY OF IRON: CPT

## 2020-01-08 RX ORDER — SACCHAROMYCES BOULARDII 250 MG
1 POWDER IN PACKET (EA) ORAL
Qty: 20 | Refills: 0
Start: 2020-01-08 | End: 2020-01-17

## 2020-01-08 RX ORDER — METRONIDAZOLE 500 MG
1 TABLET ORAL
Qty: 24 | Refills: 0
Start: 2020-01-08 | End: 2020-01-15

## 2020-01-08 RX ORDER — DULOXETINE HYDROCHLORIDE 30 MG/1
1 CAPSULE, DELAYED RELEASE ORAL
Qty: 0 | Refills: 0 | DISCHARGE

## 2020-01-08 RX ORDER — CITALOPRAM 10 MG/1
1 TABLET, FILM COATED ORAL
Qty: 0 | Refills: 0 | DISCHARGE

## 2020-01-08 RX ORDER — ACETAMINOPHEN 500 MG
2 TABLET ORAL
Qty: 0 | Refills: 0 | DISCHARGE
Start: 2020-01-08

## 2020-01-08 RX ORDER — MOXIFLOXACIN HYDROCHLORIDE TABLETS, 400 MG 400 MG/1
1 TABLET, FILM COATED ORAL
Qty: 16 | Refills: 0
Start: 2020-01-08 | End: 2020-01-15

## 2020-01-08 RX ADMIN — Medication 250 MILLIGRAM(S): at 06:31

## 2020-01-08 RX ADMIN — Medication 100 MILLIGRAM(S): at 00:20

## 2020-01-08 RX ADMIN — Medication 100 MILLIGRAM(S): at 08:33

## 2020-01-08 RX ADMIN — Medication 200 MILLIGRAM(S): at 06:31

## 2020-01-08 RX ADMIN — Medication 50 MICROGRAM(S): at 06:31

## 2020-01-08 NOTE — DISCHARGE NOTE PROVIDER - HOSPITAL COURSE
58 y/o female with PMH of diverticulitis (5 episodes in the past), depression, ADHD (on Adderall), HLD, came to the ED complaining of LLQ pain. CT abdomen showed acute descending colonic diverticulitis. Pt admitted to medicine, started on IV Cipro/Flagyl. Blood/ rine cx negative, GI PCR not detected any organisms. Pt clinically improved, no more nausea/ vomitng, abdomen pain resolved, tolerating solid diet. She is stable for discharge on PO Abx to complete 10 days.    Off note- She has anemia likely chronic, Hgb stable at this time, advised PMD follow up for further work up.        PHYSICAL EXAM:        Vital Signs Last 24 Hrs    T(C): 37.3 (08 Jan 2020 07:42), Max: 37.4 (08 Jan 2020 00:15)    T(F): 99.1 (08 Jan 2020 07:42), Max: 99.3 (08 Jan 2020 00:15)    HR: 78 (08 Jan 2020 07:42) (78 - 89)    BP: 111/71 (08 Jan 2020 07:42) (111/71 - 122/70)    BP(mean): --    RR: 18 (08 Jan 2020 07:42) (18 - 18)    SpO2: 96% (08 Jan 2020 07:42) (96% - 99%)        GENERAL: Pt lying comfortably, NAD.    CHEST/LUNG: Clear to auscultation bilaterally; No wheezing.    HEART: S1S2+, Regular rate and rhythm; No murmurs.    ABDOMEN: Soft, Nontender, Nondistended; Bowel sounds present.    Extremities: No LE edema, pulses+    NEURO: AAOX3, no focal deficits, no motor r sensory loss.    PSYCH: normal mood.        Total time spent on discharge 35

## 2020-01-08 NOTE — DISCHARGE NOTE PROVIDER - NSDCMRMEDTOKEN_GEN_ALL_CORE_FT
acetaminophen 325 mg oral tablet: 2 tab(s) orally every 6 hours, As needed, Temp greater or equal to 38C (100.4F), Mild Pain (1 - 3)  Adderall 20 mg oral tablet: 1 tab(s) orally once a day  Cipro 500 mg oral tablet: 1 tab(s) orally every 12 hours   citalopram 20 mg oral tablet: 1 tab(s) orally once a day  Flagyl 500 mg oral tablet: 1 tab(s) orally every 8 hours   saccharomyces boulardii lyo 250 mg oral capsule: 1 cap(s) orally 2 times a day  Synthroid 50 mcg (0.05 mg) oral tablet: 1 tab(s) orally once a day  Zetia 10 mg oral tablet: 1 tab(s) orally once a day

## 2020-01-08 NOTE — DISCHARGE NOTE NURSING/CASE MANAGEMENT/SOCIAL WORK - PATIENT PORTAL LINK FT
You can access the FollowMyHealth Patient Portal offered by [facility] by registering at the following website: [email or web address]. By joining [facility]’s FollowMyHealth portal, you will also be able to view your health information using other applications (apps) compatible with our system.

## 2020-01-08 NOTE — DISCHARGE NOTE PROVIDER - NSDCHC_MEDRECSTATUS_GEN_ALL_CORE
Peripheral nerve/Neuraxial procedure note : intrathecal  Pre-Procedure  Performed by FABIOLA GARNER  Location: OR      Pre-Anesthestic Checklist: patient identified, IV checked, site marked, risks and benefits discussed, informed consent, monitors and equipment checked, pre-op evaluation, at physician/surgeon's request and post-op pain management    Timeout  Correct Patient: Yes   Correct Procedure: Yes   Correct Site: Yes   Correct Laterality: Yes   Correct Position: Yes   Site Marked: Yes   .   Procedure Documentation  ASA 2  .    Procedure:    Intrathecal.  Insertion Site:L2-3  (midline approach)      Patient Prep;mask, sterile gloves, chlorhexidine gluconate and isopropyl alcohol, patient draped.  .  Needle: (). # of attempts: 2. # of redirects:. Spinal Needle: Uyen tip 25 G. 3.5 in.  Introducer used. Introducer: 20 G. .     Assessment/Narrative  .  .  clear CSF fluid removed . Comments:  Patient tolerated the procedure well         Admission Reconciliation is Completed  Discharge Reconciliation is Completed

## 2020-01-08 NOTE — DISCHARGE NOTE PROVIDER - NSDCCPCAREPLAN_GEN_ALL_CORE_FT
PRINCIPAL DISCHARGE DIAGNOSIS  Diagnosis: Acute diverticulitis  Assessment and Plan of Treatment: C/w Antibiotics as reconciled.   F/u with PMD in 1 week.      SECONDARY DISCHARGE DIAGNOSES  Diagnosis: Anemia  Assessment and Plan of Treatment: Follow up with PMD on further workl up for anemia.    Diagnosis: Hypothyroidism  Assessment and Plan of Treatment: C/w home meds  F/u with PMD    Diagnosis: ADHD  Assessment and Plan of Treatment: C/w home meds  F/u with PMD    Diagnosis: Depression  Assessment and Plan of Treatment: C/w home meds  F/u with PMD

## 2020-01-11 LAB
CULTURE RESULTS: SIGNIFICANT CHANGE UP
CULTURE RESULTS: SIGNIFICANT CHANGE UP
SPECIMEN SOURCE: SIGNIFICANT CHANGE UP
SPECIMEN SOURCE: SIGNIFICANT CHANGE UP

## 2022-05-12 NOTE — CHART NOTE - NSCHARTNOTEFT_GEN_A_CORE
05/11/22 2034   Patient Assessment/Suction   Level of Consciousness (AVPU) alert   Respiratory Effort Normal;Unlabored   Expansion/Accessory Muscles/Retractions no use of accessory muscles   PRE-TX-O2   O2 Device (Oxygen Therapy) nasal cannula   Flow (L/min) 3   SpO2 97 %   Pulse Oximetry Type Continuous   $ Pulse Oximetry - Multiple Charge Pulse Oximetry - Multiple   Pulse (!) 59   Resp (!) 25   Maintain adequate oxygenation   Pt seen/ examined and charts reviewed. Admitted overnight for acute diverticulitis.   Pt is feeling little better, still c/o abd pain specially on movements.   Exam- Vitals stable, AOX3, NAD, heart sounds regular, lungs CTAB, abd soft, mildly tender.   >Acute Diverticulitis- C/w IV Abx, CLD for now. Send GI PCR.   >Anemia- Check Anemia work up.   Refer to early morning H&P for full detail.

## 2024-07-16 NOTE — H&P ADULT - [facility] HE PE MLT D E PC
regular rate and rhythm How Severe Is Your Skin Lesion?: mild Have Your Skin Lesions Been Treated?: not been treated Is This A New Presentation, Or A Follow-Up?: Skin Lesions

## 2024-08-19 NOTE — H&P ADULT - CARDIOVASCULAR DETAILS
Writer pulled cell count and fungal cultures from pt PD cath and walked down to lab. Writer attempted to drain pt PD, over 1 hour, pt drained only 1 of 2 L from earlier. Pt in too much pain to adjust positioning for increased catheter flow. RN called MD Erickson, order to hold ICO for tonight and will reassess in the AM.   positive S2/positive S1

## 2024-09-12 NOTE — ED PROVIDER NOTE - RECENT EXPOSURE TO
Abdomen , soft, nontender, nondistended , no guarding or rigidity , no masses palpable , normal bowel sounds , Liver and Spleen,  no hepatosplenomegaly , liver nontender none known

## 2025-01-28 NOTE — ED PROVIDER NOTE - LATERALITY
Overdue for colonoscopy, patient would like to schedule for May or June 2025 when family member will be around to provide ride   left